# Patient Record
Sex: FEMALE | Race: WHITE | URBAN - METROPOLITAN AREA
[De-identification: names, ages, dates, MRNs, and addresses within clinical notes are randomized per-mention and may not be internally consistent; named-entity substitution may affect disease eponyms.]

---

## 2023-01-01 ENCOUNTER — HOSPITAL ENCOUNTER (EMERGENCY)
Age: 73
End: 2023-12-30
Attending: EMERGENCY MEDICINE
Payer: MEDICARE

## 2023-01-01 VITALS — RESPIRATION RATE: 102 BRPM | OXYGEN SATURATION: 96 % | HEART RATE: 108 BPM

## 2023-01-01 DIAGNOSIS — I46.9 CARDIAC ARREST (HCC): Primary | ICD-10-CM

## 2023-01-01 LAB
GLUCOSE BLD-MCNC: 103 MG/DL (ref 70–99)
PERFORMED ON: ABNORMAL

## 2023-01-01 PROCEDURE — 92950 HEART/LUNG RESUSCITATION CPR: CPT

## 2023-01-01 PROCEDURE — 6360000002 HC RX W HCPCS: Performed by: EMERGENCY MEDICINE

## 2023-01-01 PROCEDURE — 2500000003 HC RX 250 WO HCPCS: Performed by: EMERGENCY MEDICINE

## 2023-01-01 PROCEDURE — 31500 INSERT EMERGENCY AIRWAY: CPT

## 2023-01-01 PROCEDURE — 99285 EMERGENCY DEPT VISIT HI MDM: CPT

## 2023-01-01 RX ORDER — EPINEPHRINE IN SOD CHLOR,ISO 1 MG/10 ML
SYRINGE (ML) INTRAVENOUS DAILY PRN
Status: COMPLETED | OUTPATIENT
Start: 2023-01-01 | End: 2023-01-01

## 2023-01-01 RX ADMIN — EPINEPHRINE 1 MG: 0.1 INJECTION INTRACARDIAC; INTRAVENOUS at 14:08

## 2023-01-01 RX ADMIN — SODIUM BICARBONATE 50 MEQ: 84 INJECTION INTRAVENOUS at 14:09

## 2023-01-01 RX ADMIN — EPINEPHRINE 1 MG: 0.1 INJECTION INTRACARDIAC; INTRAVENOUS at 14:16

## 2023-01-01 RX ADMIN — EPINEPHRINE 1 MG: 0.1 INJECTION INTRACARDIAC; INTRAVENOUS at 14:12

## 2023-12-30 NOTE — ED NOTES
[facility], spoke with mamta. Patient is currently a [facility] hold at this time.  Referral #[id number]

## 2023-12-30 NOTE — ED PROVIDER NOTES
[address] [facility] [address] [address]        Pt Name: Vishal Reyes  MRN: [id number]  [address] [facility] 1950  Date of evaluation: 12/30/2023  Provider: Sarah Potter MD  PCP: Rhoda Bang  Note Started: 4:04 PM EST 12/30/23    CHIEF COMPLAINT     Per EMS cardiac arrest  HISTORY OF PRESENT ILLNESS: 1 or more Elements   No chief complaint on file. History from : EMS  Limitations to history : Acuity of condition    Vishal Reyes is a 74 y.o. female who presents to the emergency department from home via EMS. EMS reports that the call came out at 1:24 PM.  They arrived to the house at 1:30 PM.  They state the  had last seen her alive and well at 5 AM.  They report the  was doing CPR on their arrival.  On the monitor she was PEA. She received 5 doses of epi in route, had an i-gel put in. They do state family is on the way. On arrival here she is receiving CPR with a Nicole Epley machine. She is not responsive. No past medical history noted below, EMS is unaware of her medical history. Aside from what is stated above unable to elicit any other symptoms or modifying factors. Nursing Notes were all reviewed and agreed with or any disagreements addressed in HPI/MDM. REVIEW OF SYSTEMS :    Review of Systems Pertinent positive and negative findings as documented in the HPI  PAST MEDICAL HISTORY   No past medical history on file. SURGICALHISTORY     No past surgical history on file. CURRENT MEDICATIONS       Previous Medications    No medications on file      ALLERGIES     Patient has no allergy information on record. FAMILY HISTORY     No family history on file.   SOCIAL HISTORY       Social History     Socioeconomic History    Marital status:      SCREENINGS        Jassi Coma Scale  OPO Notified: Yes  Date OPO Notified: 12/30/23  Time OPO Notified: [id number]  OPO Referral Number: [id number]                  CIWA Assessment  Pulse: dictation.  PROCEDURES   Unless otherwise noted below, none  Intubation    Date/Time: 12/30/2023 2:10 PM    Performed by: Lori Rodriguez MD  Authorized by: Lori Rodriguez MD    Consent:     Consent obtained:  Emergent situation  Universal protocol:     Patient identity confirmed:  Hospital-assigned identification number  Pre-procedure details:     Indications: cardio/pulmonary arrest      Patient status:  Unresponsive    Look externally: no concerns      Pharmacologic strategy: none      Induction agents:  None    Paralytics:  None  Procedure details:     Preoxygenation:  Supraglottic device    CPR in progress: yes      Number of attempts:  1  Successful intubation attempt details:     Intubation method:  Oral    Intubation technique: video assisted      Laryngoscope blade:  Hypercurved    Bougie used: no      Tube size (mm):  7.5    Tube type:  Cuffed    Tube visualized through cords: yes    Placement assessment:     ETT at teeth/gumline (cm):  24    Tube secured with:  ETT munguia    Breath sounds:  Equal    Placement verification: colorimetric ETCO2 and direct visualization    Post-procedure details:     Procedure completion:  Tolerated    CRITICAL CARE TIME (.cct)   Due to the immediate potential for life-threatening deterioration due to acute cardiac arrest, I spent 35 minutes providing critical care. There was a high probability of clinically significant/life threatening deterioration in the patient's condition which required my urgent intervention. This time excludes time spent performing procedures but includes time spent on direct patient care, history retrieval, review of the chart, and discussions with patient, family, and consultant(s).  EMERGENCY DEPARTMENT COURSE and DIFFERENTIAL DIAGNOSIS/MDM:   CONSULTS: (Who and what was discussed)  None      Chronic Conditions: see medical history      Patient was given the following medications:  Orders Placed This Encounter   Medications    EPINEPHrine 1

## 2023-12-30 NOTE — PROGRESS NOTES
12/30/23 1559   Encounter Summary   Encounter Overview/Reason  Crisis   Service Provided For: Family   Referral/Consult From: Nursing Supervisor/Manager   Support System Spouse; Children;Family members   Last Encounter  12/30/23  (Emotional/spiritual support at code and death )   Complexity of Encounter High   Begin Time 1450   End Time  1545   Total Time Calculated 55 min   Spiritual/Emotional needs   Type Emotional Distress   Grief, Loss, and Adjustments   Type End of Life;Death   Assessment/Intervention/Outcome   Assessment Shock; Tearful   Intervention Sustaining Presence/Ministry of presence;Prayer (assurance of)/[place]   Outcome Grieving

## 2023-12-30 NOTE — ED NOTES
Received phone call from patient daughter, Kait Connelly, asking rn about [facility]. I explained any information would need to come from [facility] and verified spouses phone number as she said her dad had not received a call. She also stated she did not want him called and provided her phone number for [facility]. Call placed to [facility] to update with this information.

## 2023-12-30 NOTE — ED NOTES
Patient arrived at 0 with cpr in progress via ems jose device. Ems provided 5 rounds of epinephrine prior to arrival to ED. The following sequence of event occurred in the emergency department. 1408 epinephrine given  1409 sodium bicarb given  1410 patient intubated with 7.5 et tube, 24 @ lip  1411 pulse check, cpr resumed  1412 epinephrine given  1416 blood sugar given  1416 epinephrine given  1418 family brought to bedside  1419 family at bedside request resuscitation be stopped  1420 time of death called by Dr. Charlie Zhao.  Family remained present